# Patient Record
Sex: FEMALE | Race: WHITE
[De-identification: names, ages, dates, MRNs, and addresses within clinical notes are randomized per-mention and may not be internally consistent; named-entity substitution may affect disease eponyms.]

---

## 2017-04-06 ENCOUNTER — HOSPITAL ENCOUNTER (EMERGENCY)
Dept: HOSPITAL 80 - FED | Age: 67
Discharge: HOME | End: 2017-04-06
Payer: COMMERCIAL

## 2017-04-06 VITALS
RESPIRATION RATE: 18 BRPM | TEMPERATURE: 98.4 F | OXYGEN SATURATION: 96 % | DIASTOLIC BLOOD PRESSURE: 72 MMHG | HEART RATE: 88 BPM | SYSTOLIC BLOOD PRESSURE: 176 MMHG

## 2017-04-06 DIAGNOSIS — E11.9: ICD-10-CM

## 2017-04-06 DIAGNOSIS — R07.9: Primary | ICD-10-CM

## 2017-04-06 DIAGNOSIS — Z79.84: ICD-10-CM

## 2017-04-06 DIAGNOSIS — M79.1: ICD-10-CM

## 2017-04-06 LAB
% IMMATURE GRANULYOCYTES: 0.8 % (ref 0–1.1)
ABSOLUTE IMMATURE GRANULOCYTES: 0.09 10^3/UL (ref 0–0.1)
ABSOLUTE NRBC COUNT: 0 10^3/UL (ref 0–0.01)
ADD DIFF?: NO
ADD MORPH?: NO
ADD SCAN?: NO
ALBUMIN SERPL-MCNC: 4.6 G/DL (ref 3.5–5)
ALP SERPL-CCNC: 106 IU/L (ref 38–126)
ALT SERPL-CCNC: 38 IU/L (ref 9–52)
ANION GAP SERPL CALC-SCNC: 14 MEQ/L (ref 8–16)
AST SERPL-CCNC: 38 IU/L (ref 14–46)
ATYPICAL LYMPHOCYTE FLAG: 10 (ref 0–99)
BILIRUB SERPL-MCNC: 0.5 MG/DL (ref 0.1–1.4)
BILIRUBIN-CONJUGATED: 0.4 MG/DL (ref 0–0.5)
BILIRUBIN-UNCONJUGATED: 0.1 MG/DL (ref 0–1.1)
CALCIUM SERPL-MCNC: 9.9 MG/DL (ref 8.5–10.4)
CHLORIDE SERPL-SCNC: 97 MEQ/L (ref 97–110)
CO2 SERPL-SCNC: 28 MEQ/L (ref 22–31)
CREAT SERPL-MCNC: 0.7 MG/DL (ref 0.6–1)
ERYTHROCYTE [DISTWIDTH] IN BLOOD BY AUTOMATED COUNT: 13.7 % (ref 11.5–15.2)
FRAGMENT RBC FLAG: 0 (ref 0–99)
GFR SERPL CREATININE-BSD FRML MDRD: > 60 ML/MIN/{1.73_M2}
GLUCOSE SERPL-MCNC: 128 MG/DL (ref 70–100)
HCT VFR BLD CALC: 44 % (ref 38–47)
HGB BLD-MCNC: 14.6 G/DL (ref 12.6–16.3)
LEFT SHIFT FLG: 10 (ref 0–99)
LIPEMIA HEMOLYSIS FLAG: 80 (ref 0–99)
MCH RBC BLDCO QN: 28.3 PG (ref 27.9–34.1)
MCHC RBC AUTO-ENTMCNC: 33.2 G/DL (ref 32.4–36.7)
MCV RBC AUTO: 85.4 FL (ref 81.5–99.8)
NRBC-AUTO%: 0 % (ref 0–0.2)
PLATELET # BLD: 275 10^3/UL (ref 150–400)
PLATELET CLUMPS FLAG: 0 (ref 0–99)
PMV BLD AUTO: 9.5 FL (ref 8.7–11.7)
POTASSIUM SERPL-SCNC: 3.3 MEQ/L (ref 3.5–5.2)
PROT SERPL-MCNC: 8.1 G/DL (ref 6.3–8.2)
RBC # BLD AUTO: 5.15 10^6/UL (ref 4.18–5.33)
SODIUM SERPL-SCNC: 139 MEQ/L (ref 134–144)
TROPONIN I SERPL-MCNC: < 0.012 NG/ML (ref 0–0.03)

## 2017-04-06 PROCEDURE — 96376 TX/PRO/DX INJ SAME DRUG ADON: CPT

## 2017-04-06 PROCEDURE — 96374 THER/PROPH/DIAG INJ IV PUSH: CPT

## 2017-04-06 PROCEDURE — 71020: CPT

## 2017-04-06 PROCEDURE — 93005 ELECTROCARDIOGRAM TRACING: CPT

## 2017-04-06 PROCEDURE — 99285 EMERGENCY DEPT VISIT HI MDM: CPT

## 2017-04-06 NOTE — CPEKG
Heart Rate: 92

RR Interval: 652

P-R Interval: 120

QRSD Interval: 134

QT Interval: 384

QTC Interval: 476

P Axis: 52

QRS Axis: 113

T Wave Axis: -2

EKG Severity - ABNORMAL ECG -

EKG Impression: SINUS RHYTHM

EKG Impression: RBBB AND LPFB

Electronically Signed By: Michael Newman 06-Apr-2017 21:57:35

## 2017-04-06 NOTE — EDPHY
HPI/HX/ROS/PE/MDM


Narrative: 





CHIEF COMPLAINT: Back pain, chest pain.





HISTORY OF PRESENT ILLNESS: The patient is a 67-year-old female presenting with 

acute back pain and chest pain that started around 1pm today. The patient 

reports an episode of right shoulder pain 6 days ago that radiates down her 

right arm. This pain improved after taking Advil and stopping her new 

cholesterol medication.  However, today she developed right sided chest pain 

and right sided back pain. The pain in her back is localized  between her right 

scapula and spine.  She states the pain radiates through to her chest. She has 

right sided chest pain that is worse to the touch and worse when bending down. 

She states she was picking up things around the house today and may have 

strained a muscle. Her pain today was unrelieved with Advil. She has no history 

of shingles. 





The patient is finished a course of Amoxicillin which she was taking for 

sinusitis.  No fever, chills,  shortness of breath, palpitations, vomiting, 

diarrhea, urinary complaints, headache, or lightheadedness. 





REVIEW OF SYSTEMS:


Aside from elements discussed in the HPI, a comprehensive 10-point review of 

systems was reviewed and is negative.





PAST MEDICAL HISTORY: Diabetes, glaucoma, kidney stones, RBBB, Oral surgeries. 





SOCIAL HISTORY: . 





VITAL SIGNS:  Reviewed by me


GENERAL:  Well-developed, well-nourished, resting comfortably in no respiratory 

distress.


HEENT:  Atraumatic.  Eyes:  No icterus, no injection.  Mouth:  moist mucous 

membranes.  No erythema or lesions.  Neck:  supple with no adenopathy.


LUNGS:  Clear to auscultation bilaterally, no wheezes, rhonchi or rales.


CARDIAC:  Regular rate and rhythm, no rubs, murmurs or gallops.


ABDOMEN:  Soft, nontender, nondistended, bowel sounds normal.


BACK:  Well localized tenderness in back between spine and scapula, though 

pushing there does not reproduce pain in chest.


EXTREMITIES:  No trauma.  No edema.  Range of motion is normal throughout.


NEURO:  Alert and oriented,  grossly nonfocal.


SKIN:  Warm and dry, no rash.


PSYCHIATRIC:  Normal mentation, no agitation.





Portions of this note were transcribed by a medical scribe.  I personally 

performed a history, physical exam, medical decision making, and confirmed 

accuracy of information the transcribed note.


ED Course: 





The patient is a 67-year-old female with history of diabetes who presents with 

acute back and chest pain. The patient complain of back pain between her spine 

and scapula. On exam she has well localized tenderness there, however this pain 

does not reproduce pain in her chest when palpated. IV was established, the 

patient received 15mg Toradol IV for pain.  Plan for EKG, chest x-ray, labs, 

and troponin. 





The 12 lead EKG was interpreted by myself. See hard copy and/or "tracemaster" 

electronic copy for interpretation. Sinus rhythm, RBBB. No change from previous 

EKG.





 X-ray of the chest was obtained. I viewed the images myself on the PACS 

system. Normal.  See the full radiology report in the imaging section. 





I checked the patient's CPK to rule out rhabdomyolysis secondary to recent 

start of cholesterol medication. CPK is normal. Patient's workup so far is 

unremarkable. D-dimer is normal. WBC is normal. 





7:55 p.m.: I discussed findings with the patient. The patient feels like her 

pain is flu-like. Plan to check influenza nasal swab.  Patient's course was 

also discussed with Dr. Pereira.  Patient will follow up with Cardiology for 

further evaluation and possible risk stratification in the next several days.  

I believe the patient is safe to be discharged home.  I did not believe her 

complaints in symptoms currently represent acute coronary syndrome.  She 

understands if her symptoms should change she should return to the emergency 

department.





The differential diagnosis for the patient's chest pain included but was not 

limited to myocardial ischemia, pulmonary embolus, chest wall pain, 

musculoskeletal pain, pleural inflammation, and pulmonary infectious causes.





Influenza is still pending. Patient will call the ED tomorrow for results. 


MDM: 





After history and physical examination, the differential for this patient's 

chest pain was considered, including but not limited to, myocardial ischemia, 

acute coronary syndrome, pulmonary embolus, chest wall pain, medication effect, 

viral syndrome, shingles, and pulmonary infectious causes.





Patient's heart score is 3-4.  The patient does have hypercholesterolemia, her 

heart score would before.  However, she states that she was placed on statins 

given her age, history of diabetes, history of hypertension.  We discussed the 

possibilities of her symptoms representing acute coronary syndrome.  Symptoms 

are very atypical with persistent back tenderness, exacerbation of the 

discomfort with certain movements of the chest wall, pain in her leg which 

developed while in the emergency department and pain in the left axillary 

region which also developed while she was in the emergency department.  She 

reports that just feels like she has the "flu".  Her EKG is nonischemic.  She 

does have right bundle branch block which she states is old.  Her symptoms may 

be related to being started on statins.  She has been off them now for 4 days.  

She did have a treadmill test performed 1 and half years ago which she states 

was normal.  Plans were made for the patient to be evaluated by Dr. Paul Pereira 

tomorrow.  Patient and her family are comfortable with this plan.





- Data Points


Imaging Results: 


 Imaging Impressions





Chest X-Ray  04/06/17 18:28


Impression: Normal chest.











Laboratory Results: 


 Laboratory Results





 04/06/17 18:35 





 04/06/17 18:35 





 











  04/06/17 04/06/17 04/06/17





  20:00 18:35 18:35


 


WBC      





    


 


RBC      





    


 


Hgb      





    


 


Hct      





    


 


MCV      





    


 


MCH      





    


 


MCHC      





    


 


RDW      





    


 


Plt Count      





    


 


MPV      





    


 


Neut % (Auto)      





    


 


Lymph % (Auto)      





    


 


Mono % (Auto)      





    


 


Eos % (Auto)      





    


 


Baso % (Auto)      





    


 


Nucleat RBC Rel Count      





    


 


Absolute Neuts (auto)      





    


 


Absolute Lymphs (auto)      





    


 


Absolute Monos (auto)      





    


 


Absolute Eos (auto)      





    


 


Absolute Basos (auto)      





    


 


Absolute Nucleated RBC      





    


 


Immature Gran %      





    


 


Immature Gran #      





    


 


D-Dimer      < 0.27 ug/mLFEU ug/mLFEU





     (0.00-0.50) 


 


Sodium    139 mEq/L mEq/L  





    (134-144)  


 


Potassium    3.3 mEq/L L mEq/L  





    (3.5-5.2)  


 


Chloride    97 mEq/L mEq/L  





    ()  


 


Carbon Dioxide    28 mEq/l mEq/l  





    (22-31)  


 


Anion Gap    14 mEq/L mEq/L  





    (8-16)  


 


BUN    20 mg/dL mg/dL  





    (7-23)  


 


Creatinine    0.7 mg/dL mg/dL  





    (0.6-1.0)  


 


Estimated GFR    > 60   





    


 


Glucose    128 mg/dL H mg/dL  





    ()  


 


Calcium    9.9 mg/dL mg/dL  





    (8.5-10.4)  


 


Total Bilirubin    0.5 mg/dL mg/dL  





    (0.1-1.4)  


 


Conjugated Bilirubin    0.4 mg/dL mg/dL  





    (0.0-0.5)  


 


Unconjugated Bilirubin    0.1 mg/dL mg/dL  





    (0.0-1.1)  


 


AST    38 IU/L IU/L  





    (14-46)  


 


ALT    38 IU/L IU/L  





    (9-52)  


 


Alkaline Phosphatase    106 IU/L IU/L  





    ()  


 


Creatine Kinase    120 IU/L IU/L  





    (0-156)  


 


Troponin I    < 0.012 ng/mL ng/mL  





    (0-0.034)  


 


Total Protein    8.1 g/dL g/dL  





    (6.3-8.2)  


 


Albumin    4.6 g/dL g/dL  





    (3.5-5.0)  


 


Lipase    92.0 IU/L IU/L  





    ()  


 


Influenza A & B (PCR)  NEGATIVE FOR FLU     





   (NEGATIVE)   














  04/06/17





  18:35


 


WBC  11.19 10^3/uL H 10^3/uL





   (3.80-9.50) 


 


RBC  5.15 10^6/uL 10^6/uL





   (4.18-5.33) 


 


Hgb  14.6 g/dL g/dL





   (12.6-16.3) 


 


Hct  44.0 % %





   (38.0-47.0) 


 


MCV  85.4 fL fL





   (81.5-99.8) 


 


MCH  28.3 pg pg





   (27.9-34.1) 


 


MCHC  33.2 g/dL g/dL





   (32.4-36.7) 


 


RDW  13.7 % %





   (11.5-15.2) 


 


Plt Count  275 10^3/uL 10^3/uL





   (150-400) 


 


MPV  9.5 fL fL





   (8.7-11.7) 


 


Neut % (Auto)  70.7 % %





   (39.3-74.2) 


 


Lymph % (Auto)  19.6 % %





   (15.0-45.0) 


 


Mono % (Auto)  7.9 % %





   (4.5-13.0) 


 


Eos % (Auto)  0.6 % %





   (0.6-7.6) 


 


Baso % (Auto)  0.4 % %





   (0.3-1.7) 


 


Nucleat RBC Rel Count  0.0 % %





   (0.0-0.2) 


 


Absolute Neuts (auto)  7.91 10^3/uL H 10^3/uL





   (1.70-6.50) 


 


Absolute Lymphs (auto)  2.19 10^3/uL 10^3/uL





   (1.00-3.00) 


 


Absolute Monos (auto)  0.88 10^3/uL H 10^3/uL





   (0.30-0.80) 


 


Absolute Eos (auto)  0.07 10^3/uL 10^3/uL





   (0.03-0.40) 


 


Absolute Basos (auto)  0.05 10^3/uL 10^3/uL





   (0.02-0.10) 


 


Absolute Nucleated RBC  0.00 10^3/uL 10^3/uL





   (0-0.01) 


 


Immature Gran %  0.8 % %





   (0.0-1.1) 


 


Immature Gran #  0.09 10^3/uL 10^3/uL





   (0.00-0.10) 


 


D-Dimer  





  


 


Sodium  





  


 


Potassium  





  


 


Chloride  





  


 


Carbon Dioxide  





  


 


Anion Gap  





  


 


BUN  





  


 


Creatinine  





  


 


Estimated GFR  





  


 


Glucose  





  


 


Calcium  





  


 


Total Bilirubin  





  


 


Conjugated Bilirubin  





  


 


Unconjugated Bilirubin  





  


 


AST  





  


 


ALT  





  


 


Alkaline Phosphatase  





  


 


Creatine Kinase  





  


 


Troponin I  





  


 


Total Protein  





  


 


Albumin  





  


 


Lipase  





  


 


Influenza A & B (PCR)  





  











Medications Given: 


 








Discontinued Medications





Ketorolac Tromethamine (Toradol)  15 mg IVP EDNOW ONE


   Stop: 04/06/17 19:55


   Last Admin: 04/06/17 20:03 Dose:  15 mg


Ketorolac Tromethamine (Toradol)  15 mg IVP ONCE ONE


   Stop: 04/06/17 21:03


   Last Admin: 04/06/17 21:03 Dose:  15 mg








General


Time Seen by Provider: 04/06/17 18:13


Initial Vital Signs: 


 Initial Vital Signs











Temperature (C)  37 C   04/06/17 17:41


 


Heart Rate  100   04/06/17 17:41


 


Respiratory Rate  18   04/06/17 17:41


 


Blood Pressure  189/103 H  04/06/17 17:41


 


O2 Sat (%)  93   04/06/17 17:41








 











O2 Delivery Mode               Room Air














Allergies/Adverse Reactions: 


 





No Known Allergies Allergy (Verified 07/07/16 06:43)


 








Home Medications: 














 Medication  Instructions  Recorded


 


Losartan-Hctz 100-25 mg Tab  06/11/16


 


Metformin HCl 500 mg PO BID 06/11/16


 


Amoxicillin  04/06/17


 


Lovastatin  04/06/17














Departure





- Departure


Disposition: Home, Routine, Self-Care


Clinical Impression: 


 Chest pain, Muscle pain





Condition: Good


Instructions:  Chest Pain (ED), Musculoskeletal Pain (ED)


Additional Instructions: 


Dr. Bloise will call you tomorrow to arrange a followup appointment. If you do 

not hear from him please call his office. 





Please stop taking the cholesterol medications.  Please discuss this reaction 

with your primary care physician.





I recommend Ibuprofen (Motrin, Advil) or Naproxen Sodium (Aleve) for pain and 

anti-inflammatory effects.  You may take either one, but do not take both.


Your dose is:  Ibuprofen 600 mg every 6-8 hours with food.


 OR


Naproxen Sodium (Aleve) 220 mg every 12 hours.





Return to the emergency department or seek care urgently if you develop 

worsening pain, palpitations, lightheadedness, dizziness, fainting, fevers, or 

other concerns.





You may call the emergency department [phone] in about half an hour to 

obtain the results of your influenza testing.


Referrals: 


Bushra Francis MD [Primary Care Provider] - As per Instructions


Sergei Pereira MD [Medical Doctor] - As per Instructions


Report Scribed for: Nuha Dickey


Report Scribed by: Alexandra Gundersen


Date of Report: 04/06/17


Time of Report: 19:06

## 2017-04-27 ENCOUNTER — HOSPITAL ENCOUNTER (OUTPATIENT)
Dept: HOSPITAL 80 - BHFA | Age: 67
End: 2017-04-27
Attending: INTERNAL MEDICINE
Payer: COMMERCIAL

## 2017-04-27 DIAGNOSIS — R07.9: Primary | ICD-10-CM

## 2017-04-27 PROCEDURE — A9500 TC99M SESTAMIBI: HCPCS

## 2017-04-27 PROCEDURE — 78452 HT MUSCLE IMAGE SPECT MULT: CPT

## 2017-04-27 PROCEDURE — 93017 CV STRESS TEST TRACING ONLY: CPT

## 2017-05-16 ENCOUNTER — HOSPITAL ENCOUNTER (EMERGENCY)
Dept: HOSPITAL 80 - FED | Age: 67
Discharge: HOME | End: 2017-05-16
Payer: COMMERCIAL

## 2017-05-16 ENCOUNTER — HOSPITAL ENCOUNTER (OUTPATIENT)
Dept: HOSPITAL 80 - BHFA | Age: 67
End: 2017-05-16
Attending: INTERNAL MEDICINE
Payer: COMMERCIAL

## 2017-05-16 VITALS — TEMPERATURE: 98.4 F | OXYGEN SATURATION: 94 %

## 2017-05-16 VITALS — DIASTOLIC BLOOD PRESSURE: 84 MMHG | HEART RATE: 70 BPM | SYSTOLIC BLOOD PRESSURE: 140 MMHG | RESPIRATION RATE: 14 BRPM

## 2017-05-16 DIAGNOSIS — R42: Primary | ICD-10-CM

## 2017-05-16 DIAGNOSIS — E11.9: ICD-10-CM

## 2017-05-16 DIAGNOSIS — R07.9: Primary | ICD-10-CM

## 2017-05-16 DIAGNOSIS — I10: ICD-10-CM

## 2017-05-16 DIAGNOSIS — Z79.84: ICD-10-CM

## 2017-05-16 LAB
% IMMATURE GRANULYOCYTES: 0.4 % (ref 0–1.1)
ABSOLUTE IMMATURE GRANULOCYTES: 0.05 10^3/UL (ref 0–0.1)
ABSOLUTE NRBC COUNT: 0 10^3/UL (ref 0–0.01)
ADD DIFF?: NO
ADD MORPH?: NO
ADD SCAN?: NO
ANION GAP SERPL CALC-SCNC: 14 MEQ/L (ref 8–16)
ATYPICAL LYMPHOCYTE FLAG: 20 (ref 0–99)
CALCIUM SERPL-MCNC: 9.9 MG/DL (ref 8.5–10.4)
CHLORIDE SERPL-SCNC: 103 MEQ/L (ref 97–110)
CO2 SERPL-SCNC: 24 MEQ/L (ref 22–31)
CREAT SERPL-MCNC: 0.8 MG/DL (ref 0.6–1)
ERYTHROCYTE [DISTWIDTH] IN BLOOD BY AUTOMATED COUNT: 14 % (ref 11.5–15.2)
FRAGMENT RBC FLAG: 0 (ref 0–99)
GFR SERPL CREATININE-BSD FRML MDRD: > 60 ML/MIN/{1.73_M2}
GLUCOSE SERPL-MCNC: 152 MG/DL (ref 70–100)
HCT VFR BLD CALC: 44.2 % (ref 38–47)
HGB BLD-MCNC: 14.5 G/DL (ref 12.6–16.3)
LEFT SHIFT FLG: 20 (ref 0–99)
LIPEMIA HEMOLYSIS FLAG: 80 (ref 0–99)
MCH RBC BLDCO QN: 28.2 PG (ref 27.9–34.1)
MCHC RBC AUTO-ENTMCNC: 32.8 G/DL (ref 32.4–36.7)
MCV RBC AUTO: 85.8 FL (ref 81.5–99.8)
NRBC-AUTO%: 0 % (ref 0–0.2)
PLATELET # BLD: 271 10^3/UL (ref 150–400)
PLATELET CLUMPS FLAG: 0 (ref 0–99)
PMV BLD AUTO: 9.3 FL (ref 8.7–11.7)
POTASSIUM SERPL-SCNC: 3.8 MEQ/L (ref 3.5–5.2)
RBC # BLD AUTO: 5.15 10^6/UL (ref 4.18–5.33)
SODIUM SERPL-SCNC: 141 MEQ/L (ref 134–144)

## 2017-05-16 PROCEDURE — 96374 THER/PROPH/DIAG INJ IV PUSH: CPT

## 2017-05-16 PROCEDURE — 96376 TX/PRO/DX INJ SAME DRUG ADON: CPT

## 2017-05-16 PROCEDURE — 93005 ELECTROCARDIOGRAM TRACING: CPT

## 2017-05-16 PROCEDURE — 99284 EMERGENCY DEPT VISIT MOD MDM: CPT

## 2017-05-16 NOTE — CPEKG
Heart Rate: 83

RR Interval: 723

P-R Interval: 140

QRSD Interval: 130

QT Interval: 412

QTC Interval: 485

P Axis: 38

QRS Axis: 107

T Wave Axis: -5

EKG Severity - ABNORMAL ECG -

EKG Impression: SINUS RHYTHM

EKG Impression: RBBB AND LPFB

Electronically Signed By: Skyla John 16-May-2017 20:55:02

## 2017-05-16 NOTE — EDPHY
H & P


Time Seen by Provider: 05/16/17 16:33


HPI/ROS: 


CHIEF COMPLAINT: Vertigo





HISTORY OF PRESENT ILLNESS: This patient is a 67 year old female who presents 

to the Emergency Department complaining of an acute episode of vertigo 

presenting as she was undergoing an echocardiogram today. She states that she 

felt room-spinning dizziness when she was asked to lie down for the procedure. 

She reports associated nausea and vomited when she sat up when the 

echocardiogram was finished. Upon arrival to the Emergency Department, her 

nausea has improved. She describes a persistent spinning sensation exacerbated 

when she changes positions. She denies abdominal pain, chest pain, headache, or 

any additional complaints. She has a history of intermittent vertigo for which 

she has been evaluated by an otolaryngologist.





REVIEW OF SYSTEMS:


Constitutional: No fever, no chills


Eyes: No visual changes


ENT: No sore throat


Respiratory: No cough, no shortness of breath


Cardiac: No chest pain


Gastrointestinal: +nausea, +vomiting, no abdominal pain


Genitourinary: No hematuria, no dysuria


Musculoskeletal: No leg pain or swelling


Skin: No rash


Neurological: +room-spinning dizziness, no headache, no numbness, no weakness


Psychiatric: No depression





Past Medical/Surgical History: 


Prior medical records reviewed, including most recent visit on 4/6/2017 for 

chest pain.





PMH includes: diabetes, hypercholesteremia, kidney stones, glaucoma, RBBB, oral 

surgeries.


Social History: 


.


Physical Exam: 


General Appearance: Alert, no distress


Eyes: Pupils equal and round, no conjunctival pallor or injection


ENT, Mouth: Mucous membranes moist


Neck: Normal inspection


Respiratory: Lungs are clear to auscultation


Cardiovascular: Regular rate and rhythm 


Gastrointestinal:  Abdomen is soft and non- tender 


Neurological:  A&O, nonfocal, normal gait


Skin:  Warm and dry, no rash


Extremities:  Nontender, no pedal edema


Psychiatric: Mood and affect normal


Constitutional: 


 Initial Vital Signs











Temperature (C)  36.2 C   05/16/17 16:28


 


Heart Rate  96   05/16/17 16:28


 


Respiratory Rate  16   05/16/17 16:28


 


Blood Pressure  154/75 H  05/16/17 16:28


 


O2 Sat (%)  92   05/16/17 16:28








 











O2 Delivery Mode               Room Air














Allergies/Adverse Reactions: 


 





No Known Allergies Allergy (Verified 07/07/16 06:43)


 








Home Medications: 














 Medication  Instructions  Recorded


 


Losartan-Hctz 100-25 mg Tab  06/11/16


 


Metformin HCl 500 mg PO BID 06/11/16


 


Amlodipine Besylate  05/16/17


 


Meclizine HCl [Meclizine HCl 25 mg 25 mg PO TID PRN #15 tab 05/16/17





(RX,OTC)]  


 


Ondansetron Odt [Zofran Odt 4 mg 4 mg PO Q4 PRN #6 tab 05/16/17





(*)]  














Medical Decision Making





- Diagnostics


EKG Interpretation: 





EKG interpreted by me reveals normal sinus rhythm, rate 83; no ST/T changes; 

RBBB. EKG is unchanged from previous on 4/6/2017.


ED Course/Re-evaluation: 


This is a 67-year-old female who presents with positional room-spinning 

dizziness with associated nausea and vomiting suspicious of vertigo. She 

reports a history of vertigo with similar presentation. On exam, she has 

horizontal nystagmus with left lateral gaze. There are no additional 

significant findings. Neuro exam normal.  No concerning signs or symptoms for a 

central etiology for her vertigo.  Will proceed with labs and EKG.





IV established. 4mg IV Zofran and 25mg Antivert administered.





EKG obtained and is unchanged from previous. Labs reviewed and are unremarkable.





1755: On reevaluation, the patient reports persistent nausea when attempting to 

ambulate. Additional 4mg IV Zofran administered.





1830: On reevaluation, the patient denies any nausea or dizziness. She will be 

discharged home in good condition with meclizine to use PRN for vertigo and 

Zofran PRN for nausea. She is given customary return precautions prior to 

discharge and instructions to follow-up with her PCP and/or ENT specialist for 

further evaluation.





Differential Diagnosis: 


The differential diagnosis for the patient's dizziness included but was not 

limited to peripheral and central causes of vertigo, orthostatic causes 

including dehydration, cardiogenic and neurogenic causes, and blood loss.





- Data Points


Laboratory Results: 


 Laboratory Results





 05/16/17 16:41 





 05/16/17 16:41 





 











  05/16/17 05/16/17





  16:41 16:41


 


WBC    11.36 10^3/uL H 10^3/uL





    (3.80-9.50) 


 


RBC    5.15 10^6/uL 10^6/uL





    (4.18-5.33) 


 


Hgb    14.5 g/dL g/dL





    (12.6-16.3) 


 


Hct    44.2 % %





    (38.0-47.0) 


 


MCV    85.8 fL fL





    (81.5-99.8) 


 


MCH    28.2 pg pg





    (27.9-34.1) 


 


MCHC    32.8 g/dL g/dL





    (32.4-36.7) 


 


RDW    14.0 % %





    (11.5-15.2) 


 


Plt Count    271 10^3/uL 10^3/uL





    (150-400) 


 


MPV    9.3 fL fL





    (8.7-11.7) 


 


Neut % (Auto)    72.7 % %





    (39.3-74.2) 


 


Lymph % (Auto)    19.4 % %





    (15.0-45.0) 


 


Mono % (Auto)    6.3 % %





    (4.5-13.0) 


 


Eos % (Auto)    0.8 % %





    (0.6-7.6) 


 


Baso % (Auto)    0.4 % %





    (0.3-1.7) 


 


Nucleat RBC Rel Count    0.0 % %





    (0.0-0.2) 


 


Absolute Neuts (auto)    8.27 10^3/uL H 10^3/uL





    (1.70-6.50) 


 


Absolute Lymphs (auto)    2.20 10^3/uL 10^3/uL





    (1.00-3.00) 


 


Absolute Monos (auto)    0.71 10^3/uL 10^3/uL





    (0.30-0.80) 


 


Absolute Eos (auto)    0.09 10^3/uL 10^3/uL





    (0.03-0.40) 


 


Absolute Basos (auto)    0.04 10^3/uL 10^3/uL





    (0.02-0.10) 


 


Absolute Nucleated RBC    0.00 10^3/uL 10^3/uL





    (0-0.01) 


 


Immature Gran %    0.4 % %





    (0.0-1.1) 


 


Immature Gran #    0.05 10^3/uL 10^3/uL





    (0.00-0.10) 


 


Sodium  141 mEq/L mEq/L  





   (134-144)  


 


Potassium  3.8 mEq/L mEq/L  





   (3.5-5.2)  


 


Chloride  103 mEq/L mEq/L  





   ()  


 


Carbon Dioxide  24 mEq/l mEq/l  





   (22-31)  


 


Anion Gap  14 mEq/L mEq/L  





   (8-16)  


 


BUN  17 mg/dL mg/dL  





   (7-23)  


 


Creatinine  0.8 mg/dL mg/dL  





   (0.6-1.0)  


 


Estimated GFR  > 60   





   


 


Glucose  152 mg/dL H mg/dL  





   ()  


 


Calcium  9.9 mg/dL mg/dL  





   (8.5-10.4)  











Medications Given: 


 








Discontinued Medications





Meclizine HCl (Meclizine Hcl)  25 mg PO EDNOW ONE


   Stop: 05/16/17 17:20


   Last Admin: 05/16/17 17:30 Dose:  25 mg


Ondansetron HCl (Zofran)  4 mg IVP EDNOW ONE


   Stop: 05/16/17 16:46


   Last Admin: 05/16/17 16:50 Dose:  4 mg


Ondansetron HCl (Zofran)  4 mg IVP EDNOW ONE


   Stop: 05/16/17 18:08


   Last Admin: 05/16/17 18:13 Dose:  4 mg








Departure





- Departure


Disposition: Home, Routine, Self-Care


Clinical Impression: 


 Vertigo





Condition: Good


Instructions:  Vertigo (ED)


Additional Instructions: 


1. Take Antivert (meclizine) as prescribed, as needed for dizziness. Take 

Zofran as prescribed, as needed for nausea and vomiting.





2. Follow-up with your primary care provider for reevaluation if your symptoms 

do not entirely resolve in the next 3-5 days. You may also wish to follow-up 

with an Ear, Nose, and Throat specialist.





3. Return to the Emergency Department with uncontrollable vomiting, worsening 

dizziness, chest pain or shortness of breath, or for other serious concerns.


Referrals: 


Bushra Francis MD [Primary Care Provider] - As per Instructions


Prescriptions: 


Meclizine HCl [Meclizine HCl 25 mg (RX,OTC)] 25 mg PO TID PRN #15 tab


 PRN Reason: Dizziness


Ondansetron Odt [Zofran Odt 4 mg (*)] 4 mg PO Q4 PRN #6 tab


 PRN Reason: Nausea


Report Scribed for: Skyla John


Report Scribed by: Renay Flores


Date of Report: 05/16/17


Time of Report: 16:44


Physician Review and Approval Statement: 





05/16/17 16:44


Portions of this note were transcribed by a medical scribe.  I personally 

performed a history, physical exam, medical decision making, and confirmed 

accuracy of information the transcribed note.

## 2017-07-18 ENCOUNTER — HOSPITAL ENCOUNTER (OUTPATIENT)
Dept: HOSPITAL 80 - FIMAGING | Age: 67
End: 2017-07-18
Attending: INTERNAL MEDICINE
Payer: COMMERCIAL

## 2017-07-18 DIAGNOSIS — Z12.31: Primary | ICD-10-CM

## 2017-07-18 PROCEDURE — G0202 SCR MAMMO BI INCL CAD: HCPCS

## 2018-08-30 ENCOUNTER — HOSPITAL ENCOUNTER (OUTPATIENT)
Dept: HOSPITAL 80 - CIMAGING | Age: 68
End: 2018-08-30
Payer: COMMERCIAL

## 2018-08-30 DIAGNOSIS — K76.0: Primary | ICD-10-CM

## 2018-08-30 DIAGNOSIS — Z87.442: ICD-10-CM

## 2018-11-14 ENCOUNTER — HOSPITAL ENCOUNTER (OUTPATIENT)
Dept: HOSPITAL 80 - FIMAGING | Age: 68
End: 2018-11-14
Attending: PHYSICIAN ASSISTANT
Payer: COMMERCIAL

## 2018-11-14 DIAGNOSIS — R10.11: Primary | ICD-10-CM

## 2018-11-14 PROCEDURE — 78227 HEPATOBIL SYST IMAGE W/DRUG: CPT

## 2018-11-14 PROCEDURE — A9537 TC99M MEBROFENIN: HCPCS

## 2019-01-23 ENCOUNTER — HOSPITAL ENCOUNTER (OUTPATIENT)
Dept: HOSPITAL 80 - FIMAGING | Age: 69
End: 2019-01-23
Attending: INTERNAL MEDICINE
Payer: COMMERCIAL

## 2019-01-23 DIAGNOSIS — Z12.31: Primary | ICD-10-CM

## 2019-04-29 ENCOUNTER — HOSPITAL ENCOUNTER (OUTPATIENT)
Age: 69
End: 2019-04-29
Payer: COMMERCIAL

## 2019-04-29 DIAGNOSIS — I67.9: Primary | ICD-10-CM
